# Patient Record
Sex: MALE | Race: WHITE | Employment: FULL TIME | URBAN - METROPOLITAN AREA
[De-identification: names, ages, dates, MRNs, and addresses within clinical notes are randomized per-mention and may not be internally consistent; named-entity substitution may affect disease eponyms.]

---

## 2017-01-09 NOTE — PROGRESS NOTES
[address]  [facility] [address]  [phone]  Dept: [phone]    PRE-OP EVALUATION:  Today's date: 2017    Mike Reyes (: 1965) presents for pre-operative evaluation assessment as requested by Dr. Low.  He requires evaluation and anesthesia risk assessment prior to undergoing surgery/procedure for colon cancer screening. Proposed procedure: Colonoscopy    Date of Surgery/ Procedure: 17  Time of Surgery/ Procedure: 9:00  Hospital/Surgical Facility: [facility]  Fax number for surgical facility: [phone]  Primary Physician: Delta Vegas  Type of Anesthesia Anticipated: General    Patient has a Health Care Directive or Living Will:  NO    1. NO - Do you have a history of heart attack, stroke, stent, bypass or surgery on an artery in the head, neck, heart or legs?  2. NO - Do you ever have any pain or discomfort in your chest?  3. NO - Do you have a history of  Heart Failure?  4. NO - Are you troubled by shortness of breath when: walking on the level, up a slight hill or at night?  5. NO - Do you currently have a cold, bronchitis or other respiratory infection?  6. NO - Do you have a cough, shortness of breath or wheezing?  7. NO - Do you sometimes get pains in the calves of your legs when you walk?  8. NO - Do you or anyone in your family have previous history of blood clots?  9. NO - Do you or does anyone in your family have a serious bleeding problem such as prolonged bleeding following surgeries or cuts?  10. NO - Have you ever had problems with anemia or been told to take iron pills?  11. NO - Have you had any abnormal blood loss such as black, tarry or bloody stools, or abnormal vaginal bleeding?  12. NO - Have you ever had a blood transfusion?  13. NO - Have you or any of your relatives ever had problems with anesthesia?  14. NO - Do you have sleep apnea, excessive snoring or daytime drowsiness?  15. NO - Do you have  any prosthetic heart valves?  16. NO - Do you have prosthetic joints?  17. NO - Is there any chance that you may be pregnant?      HPI:                                                      Brief HPI related to upcoming procedure:   Patient will be undergoing a colonoscopy for colon cancer screening so is here for a pre-operative exam.      See problem list for active medical problems.  Problems all longstanding and stable, except as noted/documented.  See ROS for pertinent symptoms related to these conditions.  He denies any history of cardiovascular disease or pulmonary disease.                                                                                                 MEDICAL HISTORY:                                                      Patient Active Problem List    Diagnosis Date Noted     CARDIOVASCULAR SCREENING; LDL GOAL LESS THAN 160 10/31/2010      History reviewed. No pertinent past medical history.  Past Surgical History   Procedure Laterality Date     No history of surgery       Current Outpatient Prescriptions   Medication Sig Dispense Refill     multivitamin, therapeutic with minerals (MULTI-VITAMIN) TABS Take 1 tablet by mouth daily.       Omega-3 Fatty Acids (OMEGA-3 FISH OIL PO) Take  by mouth.       OTC products: no recent use of OTC ASA, NSAIDS or Steroids    Allergies   Allergen Reactions     No Known Allergies       Latex Allergy: NO    Social History   Substance Use Topics     Smoking status: Never Smoker      Smokeless tobacco: Not on file     Alcohol Use: Yes      Comment: rare     History   Drug Use No       REVIEW OF SYSTEMS:                                                    C: NEGATIVE for fever, chills, change in weight  I: NEGATIVE for worrisome rashes, moles or lesions  E: NEGATIVE for vision changes or irritation  E/M: NEGATIVE for ear, mouth and throat problems  R: NEGATIVE for significant cough or SOB  CV: NEGATIVE for chest pain, palpitations or peripheral edema  GI: NEGATIVE  "for nausea, abdominal pain, heartburn, or change in bowel habits  : NEGATIVE for frequency, dysuria, or hematuria  M: NEGATIVE for significant arthralgias or myalgia  N: NEGATIVE for weakness, dizziness or paresthesias  E: NEGATIVE for temperature intolerance, skin/hair changes  H: NEGATIVE for bleeding problems  P: NEGATIVE for changes in mood or affect    EXAM:                                                    /76 mmHg  Pulse 74  Temp(Src) 98  F (36.7  C) (Temporal)  Resp 18  Ht 5' 9.45\" (1.764 m)  Wt 185 lb 6.4 oz (84.097 kg)  BMI 27.03 kg/m2    GENERAL APPEARANCE: healthy, alert and no distress     EYES: EOMI, - PERRL     HENT: ear canals and TM's normal and nose and mouth without ulcers or lesions     NECK: no adenopathy, no asymmetry, masses, or scars and thyroid normal to palpation     RESP: lungs clear to auscultation - no rales, rhonchi or wheezes     CV: regular rate and rhythm, normal S1 S2, no S3 or S4 and no murmur, click or rub -     ABDOMEN:  soft, nontender, no HSM or masses and bowel sounds normal     MS: extremities normal- no gross deformities noted, no evidence of inflammation in joints, FROM in all extremities.     SKIN: no suspicious lesions or rashes     NEURO: Normal strength and tone, mentation intact and speech normal. Cranial nerves II-XII are grossly intact. DTRs are 2+/4 throughout and symmetric. Gait is stable.      PSYCH: mentation appears normal. and affect normal/bright     LYMPHATICS: No axillary, cervical, inguinal, or supraclavicular nodes    DIAGNOSTICS:                                                    No labs or EKG required for low risk surgery (cataract, skin procedure, breast biopsy, etc)    Recent Labs   Lab Test  08/24/16   0757  10/12/10   0806   NA  140  141   POTASSIUM  4.1  4.0   CR  0.85  0.84        IMPRESSION:                                                    Reason for surgery/procedure: colon cancer screening   Diagnosis/reason for consult: " pre-operative clearance    The proposed surgical procedure is considered LOW risk.    REVISED CARDIAC RISK INDEX  The patient has the following serious cardiovascular risks for perioperative complications such as (MI, PE, VFib and 3  AV Block):  No serious cardiac risks  INTERPRETATION: 0 risks: Class I (very low risk - 0.4% complication rate)    The patient has the following additional risks for perioperative complications:  No identified additional risks      ICD-10-CM    1. Preop general physical exam Z01.818    2. Colon cancer screening Z12.11        RECOMMENDATIONS:                                                        APPROVAL GIVEN to proceed with proposed procedure, without further diagnostic evaluation       Signed Electronically by: Delta Vegas PA-C    Copy of this evaluation report is provided to requesting physician.    [facility] Preop Guidelines

## 2017-01-09 NOTE — PATIENT INSTRUCTIONS
Before Your Surgery      Call your surgeon if there is any change in your health. This includes signs of a cold or flu (such as a sore throat, runny nose, cough, rash or fever).    Do not smoke, drink alcohol or take over the counter medicine (unless your surgeon or primary care doctor tells you to) for the 24 hours before and after surgery.    If you take prescribed drugs: Follow your doctor s orders about which medicines to take and which to stop until after surgery.    Eating and drinking prior to surgery: follow the instructions from your surgeon

## 2017-01-11 ENCOUNTER — ANESTHESIA EVENT (OUTPATIENT)
Dept: GASTROENTEROLOGY | Facility: CLINIC | Age: 52
End: 2017-01-11
Payer: COMMERCIAL

## 2017-01-11 ENCOUNTER — OFFICE VISIT (OUTPATIENT)
Dept: FAMILY MEDICINE | Facility: OTHER | Age: 52
End: 2017-01-11
Payer: COMMERCIAL

## 2017-01-11 VITALS
HEART RATE: 74 BPM | DIASTOLIC BLOOD PRESSURE: 76 MMHG | WEIGHT: 185.4 LBS | HEIGHT: 69 IN | RESPIRATION RATE: 18 BRPM | SYSTOLIC BLOOD PRESSURE: 102 MMHG | BODY MASS INDEX: 27.46 KG/M2 | TEMPERATURE: 98 F

## 2017-01-11 DIAGNOSIS — Z12.11 COLON CANCER SCREENING: ICD-10-CM

## 2017-01-11 DIAGNOSIS — Z01.818 PREOP GENERAL PHYSICAL EXAM: Primary | ICD-10-CM

## 2017-01-11 PROCEDURE — 99214 OFFICE O/P EST MOD 30 MIN: CPT | Performed by: PHYSICIAN ASSISTANT

## 2017-01-11 ASSESSMENT — PAIN SCALES - GENERAL: PAINLEVEL: NO PAIN (0)

## 2017-01-11 NOTE — NURSING NOTE
"Chief Complaint   Patient presents with     Pre-Op Exam     Panel Management     height, flu, colono       Initial /76 mmHg  Pulse 74  Temp(Src) 98  F (36.7  C) (Temporal)  Resp 18  Ht 5' 9.45\" (1.764 m)  Wt 185 lb 6.4 oz (84.097 kg)  BMI 27.03 kg/m2 Estimated body mass index is 27.03 kg/(m^2) as calculated from the following:    Height as of this encounter: 5' 9.45\" (1.764 m).    Weight as of this encounter: 185 lb 6.4 oz (84.097 kg).  BP completed using cuff size: manuel Flood CMA - Pediatrics      "

## 2017-01-11 NOTE — MR AVS SNAPSHOT
After Visit Summary   1/11/2017    Mike Reyes    MRN: [id number]           Patient Information     Date Of Birth          1965        Visit Information        Provider Department      1/11/2017 7:00 AM Delta Vegas PA-C [facility]        Today's Diagnoses     Preop general physical exam    -  1     Colon cancer screening           Care Instructions      Before Your Surgery      Call your surgeon if there is any change in your health. This includes signs of a cold or flu (such as a sore throat, runny nose, cough, rash or fever).    Do not smoke, drink alcohol or take over the counter medicine (unless your surgeon or primary care doctor tells you to) for the 24 hours before and after surgery.    If you take prescribed drugs: Follow your doctor s orders about which medicines to take and which to stop until after surgery.    Eating and drinking prior to surgery: follow the instructions from your surgeon          Follow-ups after your visit        Your next 10 appointments already scheduled     Jan 12, 2017   Procedure with Nathalie Low MD   [facility] ([facility])    [address] [place] [id number] [phone]              Who to contact     If you have questions or need follow up information about today's clinic visit or your schedule please contact [facility] directly at [phone].  Normal or non-critical lab and imaging results will be communicated to you by MyChart, letter or phone within 4 business days after the clinic has received the results. If you do not hear from us within 7 days, please contact the clinic through MyChart or phone. If you have a critical or abnormal lab result, we will notify you by phone as soon as possible.  Submit refill requests through [employer] or call your pharmacy and they will forward the refill request to us. Please allow 3 business days for your refill to  "be completed.          Additional Information About Your Visit        [employer] Information     [employer] gives you secure access to your electronic health record. If you see a primary care provider, you can also send messages to your care team and make appointments. If you have questions, please call your primary care clinic.  If you do not have a primary care provider, please call [phone] and they will assist you.        Care EveryWhere ID     This is your Care EveryWhere ID. This could be used by other organizations to access your [place] medical records  [id number]        Your Vitals Were     Pulse Temperature Respirations Height BMI (Body Mass Index)       74 98  F (36.7  C) (Temporal) 18 5' 9.45\" (1.764 m) 27.03 kg/m2        Blood Pressure from Last 3 Encounters:   01/11/17 102/76   09/12/16 142/100   08/22/16 120/68    Weight from Last 3 Encounters:   01/11/17 185 lb 6.4 oz (84.097 kg)   09/12/16 182 lb (82.555 kg)   08/22/16 182 lb (82.555 kg)              Today, you had the following     No orders found for display       Primary Care Provider Office Phone # Fax #    Delta Vegas PA-C [phone]       [address]  [facility] [address]        Thank you!     Thank you for choosing [facility]  for your care. Our goal is always to provide you with excellent care. Hearing back from our patients is one way we can continue to improve our services. Please take a few minutes to complete the written survey that you may receive in the mail after your visit with us. Thank you!             Your Updated Medication List - Protect others around you: Learn how to safely use, store and throw away your medicines at www.disposemymeds.org.          This list is accurate as of: 1/11/17  7:43 AM.  Always use your most recent med list.                   Brand Name Dispense Instructions for use    Multi-vitamin Tabs tablet      Take 1 tablet by mouth " daily.       OMEGA-3 FISH OIL PO      Take  by mouth.

## 2017-01-12 ENCOUNTER — ANESTHESIA (OUTPATIENT)
Dept: GASTROENTEROLOGY | Facility: CLINIC | Age: 52
End: 2017-01-12
Payer: COMMERCIAL

## 2017-01-12 ENCOUNTER — SURGERY (OUTPATIENT)
Age: 52
End: 2017-01-12

## 2017-01-12 PROCEDURE — 25000125 ZZHC RX 250: Performed by: NURSE ANESTHETIST, CERTIFIED REGISTERED

## 2017-01-12 RX ORDER — PROPOFOL 10 MG/ML
INJECTION, EMULSION INTRAVENOUS PRN
Status: DISCONTINUED | OUTPATIENT
Start: 2017-01-12 | End: 2017-01-12

## 2017-01-12 RX ORDER — LIDOCAINE HYDROCHLORIDE 20 MG/ML
INJECTION, SOLUTION INFILTRATION; PERINEURAL PRN
Status: DISCONTINUED | OUTPATIENT
Start: 2017-01-12 | End: 2017-01-12

## 2017-01-12 RX ADMIN — PROPOFOL 40 MG: 10 INJECTION, EMULSION INTRAVENOUS at 09:03

## 2017-01-12 RX ADMIN — PROPOFOL 40 MG: 10 INJECTION, EMULSION INTRAVENOUS at 09:00

## 2017-01-12 RX ADMIN — PROPOFOL 20 MG: 10 INJECTION, EMULSION INTRAVENOUS at 09:05

## 2017-01-12 RX ADMIN — PROPOFOL 50 MG: 10 INJECTION, EMULSION INTRAVENOUS at 09:12

## 2017-01-12 RX ADMIN — LIDOCAINE HYDROCHLORIDE 40 MG: 20 INJECTION, SOLUTION INFILTRATION; PERINEURAL at 09:00

## 2017-01-12 RX ADMIN — PROPOFOL 20 MG: 10 INJECTION, EMULSION INTRAVENOUS at 09:09

## 2017-01-12 RX ADMIN — PROPOFOL 30 MG: 10 INJECTION, EMULSION INTRAVENOUS at 09:11

## 2017-01-12 RX ADMIN — PROPOFOL 50 MG: 10 INJECTION, EMULSION INTRAVENOUS at 09:14

## 2017-01-12 RX ADMIN — PROPOFOL 30 MG: 10 INJECTION, EMULSION INTRAVENOUS at 09:19

## 2017-01-12 RX ADMIN — PROPOFOL 30 MG: 10 INJECTION, EMULSION INTRAVENOUS at 09:17

## 2017-01-12 NOTE — ANESTHESIA POSTPROCEDURE EVALUATION
Patient: Mike Reyes    COLONOSCOPY (N/A Rectum)  Additional InformationProcedure(s):  COLONOSCOPY  - Wound Class: II-Clean Contaminated    Diagnosis:screening  Diagnosis Additional Information: No value filed.    Anesthesia Type:  MAC    Note:  Anesthesia Post Evaluation    Patient location during evaluation: Phase 2  Patient participation: Able to fully participate in evaluation  Level of consciousness: awake and alert  Pain management: adequate  Airway patency: patent  Cardiovascular status: acceptable  Respiratory status: acceptable  Hydration status: acceptable  PONV: none     Anesthetic complications: None          Last vitals:  Filed Vitals:    01/12/17 0926 01/12/17 0930 01/12/17 0945   BP: 92/54 85/52 88/58   Temp:      Resp: 16 16 16   SpO2: 98% 98% 99%       Electronically Signed By: FAZAL Martin CRNA  January 12, 2017  10:01 AM

## 2017-01-12 NOTE — ANESTHESIA PREPROCEDURE EVALUATION
Anesthesia Evaluation     . Pt has not had prior anesthetic       ROS/MED HX    ENT/Pulmonary:  - neg pulmonary ROS     Neurologic:  - neg neurologic ROS     Cardiovascular:  - neg cardiovascular ROS       METS/Exercise Tolerance:     Hematologic:  - neg hematologic  ROS       Musculoskeletal:  - neg musculoskeletal ROS       GI/Hepatic:     (+) bowel prep,       Renal/Genitourinary:  - ROS Renal section negative       Endo:  - neg endo ROS       Psychiatric:  - neg psychiatric ROS       Infectious Disease:  - neg infectious disease ROS       Malignancy:      - no malignancy   Other:    - neg other ROS           Physical Exam  Normal systems: cardiovascular, pulmonary and dental    Airway   Mallampati: II  TM distance: <3 FB  Neck ROM: full    Dental     Cardiovascular   Rhythm and rate: regular and normal      Pulmonary    breath sounds clear to auscultation                    Anesthesia Plan      History & Physical Review  History and physical reviewed and following examination; no interval change.    ASA Status:  1 .    NPO Status:  > 8 hours    Plan for MAC with Intravenous and Propofol induction. Reason for MAC:  Deep or markedly invasive procedure (G8)  PONV prophylaxis:  Ondansetron (or other 5HT-3) and Dexamethasone or Solumedrol       Postoperative Care      Consents  Anesthetic plan, risks, benefits and alternatives discussed with:  Patient.  Use of blood products discussed: No .   .                          .

## 2017-01-12 NOTE — ANESTHESIA CARE TRANSFER NOTE
Patient: Mike Reyes    COLONOSCOPY (N/A Rectum)  Additional InformationProcedure(s):  COLONOSCOPY  - Wound Class: II-Clean Contaminated    Diagnosis: screening  Diagnosis Additional Information: No value filed.    Anesthesia Type:   MAC     Note:  Airway :Face Mask  Patient transferred to:Phase II  Comments: Transported to Phase 2, report to RN.      Vitals: (Last set prior to Anesthesia Care Transfer)              Electronically Signed By: FAZAL Martin CRNA  January 12, 2017  9:26 AM

## 2017-03-23 NOTE — PROGRESS NOTES
"  SUBJECTIVE:                                                    Mike Reyes is a 51 year old male who presents to clinic today for the following health issues:      HPI    Joint Pain- Left thumb injury,  Right elbow     Onset:2-3 weeks ago.      Description:   Location: left thumb injury, right elbow  Character: Burning    Intensity: 2/10    Progression of Symptoms: worse-elbow    Accompanying Signs & Symptoms:  Other symptoms: none   History:   Previous similar pain: no       Precipitating factors:   Trauma or overuse: YES- thumb    Alleviating factors:  Improved by: nothing       Therapies Tried and outcome: tylenol, ice right away    Swelling is worsening, no numbness or tingling. Redness a little at the tip of the finger with bruising.  ROM slightly impaired.       Right Elbow:   No numbness or tingling  Pain with tenderness along the lateral side of the elbow   Grab and lifting increases pain   Repetitive motion bothers it along with grasping items.   Pain dose radiate down right forearm at times with aggravation.       Problem list and histories reviewed & adjusted, as indicated.  Additional history: as documented        ROS:  C: NEGATIVE for fever, chills, change in weight  R: NEGATIVE for significant cough or SOB  CV: NEGATIVE for chest pain, palpitations or peripheral edema    OBJECTIVE:                                                    /72  Pulse 52  Temp 97.9  F (36.6  C) (Temporal)  Resp 12  Ht 5' 9.17\" (1.757 m)  Wt 184 lb 6.4 oz (83.6 kg)  SpO2 97%  BMI 27.1 kg/m2  Body mass index is 27.1 kg/(m^2).  Physical Exam   Constitutional: He is oriented to person, place, and time. Vital signs are normal.   Musculoskeletal:        Right elbow: He exhibits normal range of motion, no swelling, no effusion, no deformity and no laceration. Tenderness found. Lateral epicondyle tenderness noted.        Left hand: He exhibits tenderness (left thumb near nail bed. ) and swelling. He exhibits normal " range of motion, no bony tenderness, normal capillary refill and no deformity. Normal sensation noted. Normal strength noted.   Left thumb- moderate amount of swelling and bruising, slightly warm, ROM intact, sensation intact, strength intact.       Right elbow- tenderness along the lateral epicondyle. No change in ROM, no swelling, no redness or bruising.    Neurological: He is alert and oriented to person, place, and time. He has normal strength. No sensory deficit.         Diagnostic Test Results:  Xray - right elbow and left thumb.      ASSESSMENT/PLAN:                                                        1. Injury of left thumb, initial encounter   - Suspect this could be a sprain vs musculoskeletal contusion. I did discuss that given the trauma from the hockey puck it is possible he did pop a blood vessel. We will see what the Xray results show. I did advise him to have some immobilization to help with the healing, I will also have him do ROM to keep the joint from getting stiff. It is reassuring he does not have any numbness or tingling and low pain.  - XR Finger Left G/E 2 Views; Future    2. Right elbow pain  - Suspect that given exam was positive for lateral epicondyle tenderness along with his HPI included pain down his arm with grasping and repetitive motion. No numbness or tingling.   - I will do an xray and he did want this, however, I did discuss with him home care for this including an elbow brace, stretching, NSAID therapy and possible physical therapy.   - XR Elbow Right 2 Views; Future    See Patient Instructions    FAZAL Mcallister [facility]

## 2017-03-24 ENCOUNTER — RADIANT APPOINTMENT (OUTPATIENT)
Dept: GENERAL RADIOLOGY | Facility: OTHER | Age: 52
End: 2017-03-24
Attending: NURSE PRACTITIONER
Payer: COMMERCIAL

## 2017-03-24 ENCOUNTER — TELEPHONE (OUTPATIENT)
Dept: FAMILY MEDICINE | Facility: OTHER | Age: 52
End: 2017-03-24

## 2017-03-24 ENCOUNTER — OFFICE VISIT (OUTPATIENT)
Dept: FAMILY MEDICINE | Facility: OTHER | Age: 52
End: 2017-03-24
Payer: COMMERCIAL

## 2017-03-24 VITALS
OXYGEN SATURATION: 97 % | WEIGHT: 184.4 LBS | RESPIRATION RATE: 12 BRPM | TEMPERATURE: 97.9 F | SYSTOLIC BLOOD PRESSURE: 114 MMHG | HEART RATE: 52 BPM | BODY MASS INDEX: 27.31 KG/M2 | DIASTOLIC BLOOD PRESSURE: 72 MMHG | HEIGHT: 69 IN

## 2017-03-24 DIAGNOSIS — M25.521 RIGHT ELBOW PAIN: ICD-10-CM

## 2017-03-24 DIAGNOSIS — S69.92XA INJURY OF LEFT THUMB, INITIAL ENCOUNTER: ICD-10-CM

## 2017-03-24 DIAGNOSIS — S62.525A CLOSED NONDISPLACED FRACTURE OF DISTAL PHALANX OF LEFT THUMB, INITIAL ENCOUNTER: Primary | ICD-10-CM

## 2017-03-24 DIAGNOSIS — S69.92XA INJURY OF LEFT THUMB, INITIAL ENCOUNTER: Primary | ICD-10-CM

## 2017-03-24 PROCEDURE — 73140 X-RAY EXAM OF FINGER(S): CPT | Mod: LT

## 2017-03-24 PROCEDURE — 73070 X-RAY EXAM OF ELBOW: CPT | Mod: RT

## 2017-03-24 PROCEDURE — 99213 OFFICE O/P EST LOW 20 MIN: CPT | Performed by: NURSE PRACTITIONER

## 2017-03-24 ASSESSMENT — PAIN SCALES - GENERAL: PAINLEVEL: MILD PAIN (2)

## 2017-03-24 NOTE — MR AVS SNAPSHOT
After Visit Summary   3/24/2017    Mike Reyes    MRN: [id number]           Patient Information     Date Of Birth          1965        Visit Information        Provider Department      3/24/2017 11:20 AM Libby Perez APRN CNP [facility]        Today's Diagnoses     Injury of left thumb, initial encounter    -  1    Right elbow pain          Care Instructions    - TYLENOL 1000 mg every 6 hours; maximum daily dose: 3000 mg daily alternate with ibuprofen   - Ice your thumb joint 3-4 times daily along with your right elbow  - Recommend keeping splint on your left thumb for support until we know what is going on.   -I will follow up with you regarding your xray results.     FAZAL Mcallister CNP      Tennis Elbow  Muscles connect to bones by thick, fibrous cords (tendons). When the muscles are overused by repeated motion, the tendons may become inflamed and painful. This condition is called tendonitis.  Tennis elbow (lateral epicondylitis) is a form of tendonitis. It occurs when the forearm muscles are used again and again in a twisting motion. Pain from tennis elbow occurs mainly on the outside of the elbow. But the pain can spread into the forearm and wrist. Your elbow may also be swollen and tender to the touch.  The pain may get worse when you move your arm or do simple activities. Bending your wrist back, shaking hands, or turning a doorknob may cause pain. The pain often gets worse after several weeks or months. Sometimes you may feel pain when your arm is still.  Tennis players who use a backhand stroke with poor technique are more likely to get tennis elbow. But playing tennis is only one cause of tennis elbow. Other common activities that can cause it include:    Hammering    Painting    Raking  Besides tennis players, people at risk include , gardeners, musicians, and dentists. Sometimes people get tennis elbow without doing anything that would cause  the injury.  Treatment includes resting the arm and taking anti-inflammatory medicines. Special splints help ease symptoms. Symptoms should get better after 4 to 6 weeks of rest. You may need steroid injections if resting and using a splint don t help. After the pain is relieved, you should change your activities so the symptoms don t return. You may need physical therapy. It may include stretching, range-of-motion, and strengthening exercises. These treatments help most cases. You may need surgery if your symptoms continue for 6 months.  Home care  Follow these guidelines when caring for yourself at home:    Rest your elbow as needed. Protect it from movement that causes pain. You may be told to use a forearm splint at night to ease symptoms in the morning. Your health care provider may recommend a special wrap or splint to compress the muscles of the forearm. This can ease pain during daytime activities. As your symptoms get better, start to move your elbow more.    Put an ice pack on the injured area. Do this for 20 minutes every 1 to 2 hours the first day for pain relief. You can make an ice pack by wrapping a plastic bag of ice cubes in a thin towel. Continue using the ice pack 3 to 4 times a day for the next 2 days. Then use the ice pack as needed to ease pain and swelling.    You may use acetaminophen or ibuprofen to control pain, unless another pain medicine was prescribed. If you have chronic liver or kidney disease, talk with your health care provider before using these medicines. Also talk with your provider if you ve had a stomach ulcer or GI bleeding.    After your elbow heals, avoid the motion that caused your pain. Or learn to move in a way that causes less stress on the tendon. Using a forearm wrap may keep tennis elbow from happening again.    A tennis elbow strap may ease pain and keep you from further injury when you start playing tennis again. You can also lower your risk for injury by warming up  before you play and cooling down afterward. You should also use the right equipment. For instance, make sure your racquet has the right  and is the right size for you.  Follow-up care  Follow up with your health care provider, or as advised, if your symptoms don t get better after 1 week of treatment.  When to seek medical advice  Call your health care provider right away if any of these occur:    Redness over the painful area    Pain or swelling at the elbow gets worse    Any numbness or tingling in your arm, hands, or fingers    Unexplained fever over 101 F (37.8 C)     0119-7806 [employer]. [address] [place] [address]. All rights reserved. This information is not intended as a substitute for professional medical care. Always follow your healthcare professional's instructions.        What is Tennis Elbow?        Repeated twisting of a screwdriver can cause problems over time.       Tennis elbow (also called lateral epicondylitis) is the gradual break down of the tendon around the bony knob (lateral epicondyle) on the outer side of the elbow. It occurs when the tissue that attaches muscle to the bone becomes irritated and somtimes inflamed.  Your lateral epicondyle  The muscles that allow you to straighten your fingers and rotate your lower arm and wrist are called the extensor muscles.  These muscles extend from the outer side of your elbow to your wrist and fingers. A cord-like fiber called a tendon attaches the extensor muscles to the elbow. Overuse or an accident can cause tissue in the tendon to become inflamed, injured, or degenerated.  Causes  Playing a racket sport can cause tennis elbow. So can doing anything that involves extending your wrist or rotating your forearm, such as:    Twisting a screwdriver    Hammering    Painting    Llifting heavy objects with your palm down  With age, the tissue may become injured or irritated more easily.  Symptoms  Symptoms generally  develop over time. The most common symptom of tennis elbow is pain or burning on the outer side of the elbow and down the forearm. You may have pain all the time or only when you lift things. The elbow may also swell. And it may hurt to  things, turn your hand, or swing your arm.     The road to healing  To prevent a flare-up after treatment, you may need to change the way you do some things. Gripping with the palm up, lifting heavy objects with both hands, or varying activities throughout the day will help reduce stress on the tendon.     3649-1750 [employer]. [address] [place] [address]. All rights reserved. This information is not intended as a substitute for professional medical care. Always follow your healthcare professional's instructions.              Follow-ups after your visit        Future tests that were ordered for you today     Open Future Orders        Priority Expected Expires Ordered    XR Finger Left G/E 2 Views Routine 3/24/2017 3/24/2018 3/24/2017    XR Elbow Right 2 Views Routine 3/24/2017 3/24/2018 3/24/2017            Who to contact     If you have questions or need follow up information about today's clinic visit or your schedule please contact [facility] directly at [phone].  Normal or non-critical lab and imaging results will be communicated to you by compropagohart, letter or phone within 4 business days after the clinic has received the results. If you do not hear from us within 7 days, please contact the clinic through compropagohart or phone. If you have a critical or abnormal lab result, we will notify you by phone as soon as possible.  Submit refill requests through [employer] or call your pharmacy and they will forward the refill request to us. Please allow 3 business days for your refill to be completed.          Additional Information About Your Visit        [employer] Information     [employer] gives you secure access to your electronic health  "record. If you see a primary care provider, you can also send messages to your care team and make appointments. If you have questions, please call your primary care clinic.  If you do not have a primary care provider, please call [phone] and they will assist you.        Care EveryWhere ID     This is your Care EveryWhere ID. This could be used by other organizations to access your [facility] medical records  [id number]        Your Vitals Were     Pulse Temperature Respirations Height Pulse Oximetry BMI (Body Mass Index)    52 97.9  F (36.6  C) (Temporal) 12 5' 9.17\" (1.757 m) 97% 27.1 kg/m2       Blood Pressure from Last 3 Encounters:   03/24/17 114/72   01/12/17 109/88   01/11/17 102/76    Weight from Last 3 Encounters:   03/24/17 184 lb 6.4 oz (83.6 kg)   01/11/17 185 lb 6.4 oz (84.1 kg)   09/12/16 182 lb (82.6 kg)               Primary Care Provider Office Phone # Fax #    [place] PA-[facility] [phone]       [facility] [address]  [facility] [address]        Thank you!     Thank you for choosing [facility]  for your care. Our goal is always to provide you with excellent care. Hearing back from our patients is one way we can continue to improve our services. Please take a few minutes to complete the written survey that you may receive in the mail after your visit with us. Thank you!             Your Updated Medication List - Protect others around you: Learn how to safely use, store and throw away your medicines at www.disposemymeds.org.          This list is accurate as of: 3/24/17 11:55 AM.  Always use your most recent med list.                   Brand Name Dispense Instructions for use    Multi-vitamin Tabs tablet      Take 1 tablet by mouth daily.       OMEGA-3 FISH OIL PO      Take  by mouth.         "

## 2017-03-24 NOTE — TELEPHONE ENCOUNTER
Lm for patient to call us back - thumb Fx- referral  Placed for ortho and inform him that someone will be calling him to set up appointment.

## 2017-03-24 NOTE — NURSING NOTE
"Chief Complaint   Patient presents with     Thumb Discomfort     left thumb injury     Panel Management     height       Initial /72  Pulse 52  Temp 97.9  F (36.6  C) (Temporal)  Resp 12  Ht 5' 9.17\" (1.757 m)  Wt 184 lb 6.4 oz (83.6 kg)  SpO2 97%  BMI 27.1 kg/m2 Estimated body mass index is 27.1 kg/(m^2) as calculated from the following:    Height as of this encounter: 5' 9.17\" (1.757 m).    Weight as of this encounter: 184 lb 6.4 oz (83.6 kg).  Medication Reconciliation: complete  Sinai Ruiz CMA (AAMA)    "

## 2017-03-24 NOTE — TELEPHONE ENCOUNTER
Please also inform patient his right elbow xray was normal. I would like him to continue with NSAID treatment and try a elbow brace. We can also consider PT if he would like.     FAZAL Mcallister CNP

## 2017-03-24 NOTE — PATIENT INSTRUCTIONS
- TYLENOL 1000 mg every 6 hours; maximum daily dose: 3000 mg daily alternate with ibuprofen   - Ice your thumb joint 3-4 times daily along with your right elbow  - Recommend keeping splint on your left thumb for support until we know what is going on.   -I will follow up with you regarding your xray results.     FAZAL Mcallister CNP      Tennis Elbow  Muscles connect to bones by thick, fibrous cords (tendons). When the muscles are overused by repeated motion, the tendons may become inflamed and painful. This condition is called tendonitis.  Tennis elbow (lateral epicondylitis) is a form of tendonitis. It occurs when the forearm muscles are used again and again in a twisting motion. Pain from tennis elbow occurs mainly on the outside of the elbow. But the pain can spread into the forearm and wrist. Your elbow may also be swollen and tender to the touch.  The pain may get worse when you move your arm or do simple activities. Bending your wrist back, shaking hands, or turning a doorknob may cause pain. The pain often gets worse after several weeks or months. Sometimes you may feel pain when your arm is still.  Tennis players who use a backhand stroke with poor technique are more likely to get tennis elbow. But playing tennis is only one cause of tennis elbow. Other common activities that can cause it include:    Hammering    Painting    Raking  Besides tennis players, people at risk include , gardeners, musicians, and dentists. Sometimes people get tennis elbow without doing anything that would cause the injury.  Treatment includes resting the arm and taking anti-inflammatory medicines. Special splints help ease symptoms. Symptoms should get better after 4 to 6 weeks of rest. You may need steroid injections if resting and using a splint don t help. After the pain is relieved, you should change your activities so the symptoms don t return. You may need physical therapy. It may include stretching,  range-of-motion, and strengthening exercises. These treatments help most cases. You may need surgery if your symptoms continue for 6 months.  Home care  Follow these guidelines when caring for yourself at home:    Rest your elbow as needed. Protect it from movement that causes pain. You may be told to use a forearm splint at night to ease symptoms in the morning. Your health care provider may recommend a special wrap or splint to compress the muscles of the forearm. This can ease pain during daytime activities. As your symptoms get better, start to move your elbow more.    Put an ice pack on the injured area. Do this for 20 minutes every 1 to 2 hours the first day for pain relief. You can make an ice pack by wrapping a plastic bag of ice cubes in a thin towel. Continue using the ice pack 3 to 4 times a day for the next 2 days. Then use the ice pack as needed to ease pain and swelling.    You may use acetaminophen or ibuprofen to control pain, unless another pain medicine was prescribed. If you have chronic liver or kidney disease, talk with your health care provider before using these medicines. Also talk with your provider if you ve had a stomach ulcer or GI bleeding.    After your elbow heals, avoid the motion that caused your pain. Or learn to move in a way that causes less stress on the tendon. Using a forearm wrap may keep tennis elbow from happening again.    A tennis elbow strap may ease pain and keep you from further injury when you start playing tennis again. You can also lower your risk for injury by warming up before you play and cooling down afterward. You should also use the right equipment. For instance, make sure your racquet has the right  and is the right size for you.  Follow-up care  Follow up with your health care provider, or as advised, if your symptoms don t get better after 1 week of treatment.  When to seek medical advice  Call your health care provider right away if any of these  occur:    Redness over the painful area    Pain or swelling at the elbow gets worse    Any numbness or tingling in your arm, hands, or fingers    Unexplained fever over 101 F (37.8 C)     6077-2814 [employer]. [address] [place] [address], [place] [address]. All rights reserved. This information is not intended as a substitute for professional medical care. Always follow your healthcare professional's instructions.        What is Tennis Elbow?        Repeated twisting of a screwdriver can cause problems over time.       Tennis elbow (also called lateral epicondylitis) is the gradual break down of the tendon around the bony knob (lateral epicondyle) on the outer side of the elbow. It occurs when the tissue that attaches muscle to the bone becomes irritated and somtimes inflamed.  Your lateral epicondyle  The muscles that allow you to straighten your fingers and rotate your lower arm and wrist are called the extensor muscles.  These muscles extend from the outer side of your elbow to your wrist and fingers. A cord-like fiber called a tendon attaches the extensor muscles to the elbow. Overuse or an accident can cause tissue in the tendon to become inflamed, injured, or degenerated.  Causes  Playing a racket sport can cause tennis elbow. So can doing anything that involves extending your wrist or rotating your forearm, such as:    Twisting a screwdriver    Hammering    Painting    Llifting heavy objects with your palm down  With age, the tissue may become injured or irritated more easily.  Symptoms  Symptoms generally develop over time. The most common symptom of tennis elbow is pain or burning on the outer side of the elbow and down the forearm. You may have pain all the time or only when you lift things. The elbow may also swell. And it may hurt to  things, turn your hand, or swing your arm.     The road to healing  To prevent a flare-up after treatment, you may need to change the way you do some things.  Gripping with the palm up, lifting heavy objects with both hands, or varying activities throughout the day will help reduce stress on the tendon.     1542-0820 [employer]. [address] [place] [address], [place] [address]. All rights reserved. This information is not intended as a substitute for professional medical care. Always follow your healthcare professional's instructions.

## 2017-03-24 NOTE — TELEPHONE ENCOUNTER
Please let patient know that he has a nondisplaced distal phalanx fracture. I would like him to keep the splint I gave him on and follow up with ortho at the next available appointment, ok to wait till Monday.   Continue with current medication regimen and icing. I am still awaiting a final read on his elbow xray.     Libby Perez, FAZAL CNP

## 2017-03-24 NOTE — TELEPHONE ENCOUNTER
Spoke to pt. He will try the brace and then consider PT if things dont improve. Putting reports in the mail for pt.

## 2017-03-24 NOTE — TELEPHONE ENCOUNTER
Patient informed. He is wondering if you could e mail the report to him? [email or web address]  Mail to home if cannot e mail

## 2017-03-27 ENCOUNTER — TELEPHONE (OUTPATIENT)
Dept: FAMILY MEDICINE | Facility: OTHER | Age: 52
End: 2017-03-27

## 2017-03-27 NOTE — TELEPHONE ENCOUNTER
Reason for call:  Patient states he received the xray results from 3/24/17, but not follow up instructions.  Is asking if he needs to see ortho, if casting is advised with thumb fracture. Or if wearing the splint is enough.  Also is asking for xray resuults to be released on my chart.    Please advise.  Would cristina call back today please.

## 2017-03-28 NOTE — TELEPHONE ENCOUNTER
Patient advised of results and that ortho will be calling to schedule and the reports should be viewable on MyChart.

## 2019-09-28 ENCOUNTER — HEALTH MAINTENANCE LETTER (OUTPATIENT)
Age: 54
End: 2019-09-28

## 2021-01-09 ENCOUNTER — HEALTH MAINTENANCE LETTER (OUTPATIENT)
Age: 56
End: 2021-01-09

## 2021-04-04 ASSESSMENT — ENCOUNTER SYMPTOMS
MYALGIAS: 0
WEAKNESS: 0
SHORTNESS OF BREATH: 0
FEVER: 0
HEARTBURN: 0
COUGH: 0
HEMATURIA: 0
ABDOMINAL PAIN: 0
DIZZINESS: 0
NERVOUS/ANXIOUS: 0
PALPITATIONS: 0
DYSURIA: 0
FREQUENCY: 0
CONSTIPATION: 0
NAUSEA: 0
DIARRHEA: 0
SORE THROAT: 0
ARTHRALGIAS: 0
JOINT SWELLING: 0
CHILLS: 0
HEMATOCHEZIA: 0
PARESTHESIAS: 0
HEADACHES: 0
EYE PAIN: 0

## 2021-04-05 NOTE — PROGRESS NOTES
SUBJECTIVE:   CC: Mike Reyes is an 55 year old male who presents for preventative health visit.       Patient has been advised of split billing requirements and indicates understanding: Yes  Healthy Habits:     Getting at least 3 servings of Calcium per day:  Yes    Bi-annual eye exam:  Yes    Dental care twice a year:  Yes    Sleep apnea or symptoms of sleep apnea:  None    Diet:  Regular (no restrictions)    Frequency of exercise:  1 day/week    Duration of exercise:  15-30 minutes    Taking medications regularly:  Yes    Medication side effects:  Not applicable    PHQ-2 Total Score: 0    Additional concerns today:  No    Mike presents today for his annual physical. Remains in good health with no acute concerns today.     Today's PHQ-2 Score:   PHQ-2 ( 1999 Pfizer) 4/4/2021   Q1: Little interest or pleasure in doing things 0   Q2: Feeling down, depressed or hopeless 0   PHQ-2 Score 0   Q1: Little interest or pleasure in doing things Not at all   Q2: Feeling down, depressed or hopeless Not at all   PHQ-2 Score 0       Abuse: Current or Past(Physical, Sexual or Emotional)- No  Do you feel safe in your environment? Yes    Have you ever done Advance Care Planning? (For example, a Health Directive, POLST, or a discussion with a medical provider or your loved ones about your wishes): No, advance care planning information given to patient to review.  Patient declined advance care planning discussion at this time.    Social History     Tobacco Use     Smoking status: Never Smoker     Smokeless tobacco: Never Used   Substance Use Topics     Alcohol use: Yes     Comment: 2-3 drinks a week     If you drink alcohol do you typically have >3 drinks per day or >7 drinks per week? No    Alcohol Use 4/8/2021   Prescreen: >3 drinks/day or >7 drinks/week? -   Prescreen: >3 drinks/day or >7 drinks/week? No       Last PSA:   PSA   Date Value Ref Range Status   08/24/2016 0.94 0 - 4 ug/L Final     Comment:     Assay Method:   "Chemiluminescence using Siemens Vista analyzer       Reviewed orders with patient. Reviewed health maintenance and updated orders accordingly - Yes      Reviewed and updated as needed this visit by clinical staff  Tobacco  Allergies  Meds  Problems  Med Hx  Surg Hx  Fam Hx  Soc Hx          Reviewed and updated as needed this visit by Provider  Tobacco  Allergies  Meds  Problems  Med Hx  Surg Hx  Fam Hx         Review of Systems   Constitutional: Negative for chills and fever.   HENT: Negative for congestion, ear pain, hearing loss and sore throat.    Eyes: Negative for pain and visual disturbance.   Respiratory: Negative for cough and shortness of breath.    Cardiovascular: Negative for chest pain, palpitations and peripheral edema.   Gastrointestinal: Negative for abdominal pain, constipation, diarrhea, heartburn, hematochezia and nausea.   Genitourinary: Negative for discharge, dysuria, frequency, genital sores, hematuria, impotence and urgency.   Musculoskeletal: Negative for arthralgias, joint swelling and myalgias.   Skin: Negative for rash.   Neurological: Negative for dizziness, weakness, headaches and paresthesias.   Psychiatric/Behavioral: Negative for mood changes. The patient is not nervous/anxious.        OBJECTIVE:   /80   Pulse 61   Temp 96.7  F (35.9  C)   Resp 16   Ht 1.74 m (5' 8.5\")   Wt 88 kg (194 lb)   SpO2 98%   BMI 29.07 kg/m      Physical Exam  GENERAL: healthy, alert and no distress  EYES: Eyes grossly normal to inspection, PERRL and conjunctivae and sclerae normal  HENT: ear canals and TM's normal, nose and mouth without ulcers or lesions  NECK: no adenopathy, no asymmetry, masses, or scars and thyroid normal to palpation  RESP: lungs clear to auscultation - no rales, rhonchi or wheezes  CV: regular rate and rhythm, normal S1 S2, no S3 or S4, no murmur, click or rub, no peripheral edema and peripheral pulses strong  ABDOMEN: soft, nontender, no hepatosplenomegaly, " "no masses and bowel sounds normal   (male): normal male circumcised genitalia without lesions or urethral discharge, no hernia  MS: no gross musculoskeletal defects noted, no edema. FROM to all extremities. No spinal tenderness.   SKIN: no suspicious lesions or rashes  NEURO: Normal strength and tone, mentation intact and speech normal. Cranial nerves II-XII are grossly intact. DTRs are 2+/4 throughout and symmetric. Gait is stable. .  PSYCH: mentation appears normal, affect normal/bright    ASSESSMENT/PLAN:       ICD-10-CM    1. Routine general medical examination at a health care facility  Z00.00    2. Screening for HIV (human immunodeficiency virus)  Z11.4 HIV Antigen Antibody Combo   3. Screening for prostate cancer  Z12.5 PSA, screen   4. Screening for diabetes mellitus  Z13.1 Basic metabolic panel  (Ca, Cl, CO2, Creat, Gluc, K, Na, BUN)   5. CARDIOVASCULAR SCREENING; LDL GOAL LESS THAN 160  Z13.6 Lipid panel reflex to direct LDL Fasting       2-5. Will obtain updated fasting labs. Scheduled for tomorrow at 7:15 am.     Follow up annually.    Patient has been advised of split billing requirements and indicates understanding: Yes  COUNSELING:   Reviewed preventive health counseling, as reflected in patient instructions       Regular exercise       Healthy diet/nutrition    Estimated body mass index is 29.07 kg/m  as calculated from the following:    Height as of this encounter: 1.74 m (5' 8.5\").    Weight as of this encounter: 88 kg (194 lb).     Weight management plan: Discussed healthy diet and exercise guidelines    He reports that he has never smoked. He has never used smokeless tobacco.      Counseling Resources:  ATP IV Guidelines  Pooled Cohorts Equation Calculator  FRAX Risk Assessment  ICSI Preventive Guidelines  Dietary Guidelines for Americans, 2010  USDA's MyPlate  ASA Prophylaxis  Lung CA Screening     Patient seen in conjunction with ELENA Guillen.     [place] PA-C  [facility] " [facility] BERTRAM AGUERO

## 2021-04-05 NOTE — PATIENT INSTRUCTIONS
Preventive Health Recommendations  Male Ages 50 - 64    Yearly exam:             See your health care provider every year in order to  o   Review health changes.   o   Discuss preventive care.    o   Review your medicines if your doctor has prescribed and     Have a cholesterol test every 5 years, or more frequently if you are at risk for high cholesterol/heart disease.     Have a diabetes test (fasting glucose) every three years. If you are at risk for diabetes, you should have this test more often.     Have a colonoscopy at age 50, or have a yearly FIT test (stool test). These exams will check for colon cancer.      Talk with your health care provider about whether or not a prostate cancer screening test (PSA) is right for you.    You should be tested each year for STDs (sexually transmitted diseases), if you re at risk.     Shots: Get a flu shot each year. Get a tetanus shot every 10 years.     Nutrition:    Eat at least 5 servings of fruits and vegetables daily.     Eat whole-grain bread, whole-wheat pasta and brown rice instead of white grains and rice.     Get adequate Calcium and Vitamin D.     Lifestyle    Exercise for at least 150 minutes a week (30 minutes a day, 5 days a week). This will help you control your weight and prevent disease.     Limit alcohol to one drink per day.     No smoking.     Wear sunscreen to prevent skin cancer.     See your dentist every six months for an exam and cleaning.     See your eye doctor every 1 to 2 years.    Come in for updated fasting labs tomorrow.

## 2021-04-08 ENCOUNTER — OFFICE VISIT (OUTPATIENT)
Dept: FAMILY MEDICINE | Facility: OTHER | Age: 56
End: 2021-04-08
Payer: COMMERCIAL

## 2021-04-08 VITALS
HEIGHT: 69 IN | RESPIRATION RATE: 16 BRPM | TEMPERATURE: 96.7 F | BODY MASS INDEX: 28.73 KG/M2 | HEART RATE: 61 BPM | SYSTOLIC BLOOD PRESSURE: 120 MMHG | OXYGEN SATURATION: 98 % | WEIGHT: 194 LBS | DIASTOLIC BLOOD PRESSURE: 80 MMHG

## 2021-04-08 DIAGNOSIS — Z13.6 CARDIOVASCULAR SCREENING; LDL GOAL LESS THAN 160: ICD-10-CM

## 2021-04-08 DIAGNOSIS — Z13.1 SCREENING FOR DIABETES MELLITUS: ICD-10-CM

## 2021-04-08 DIAGNOSIS — Z12.5 SCREENING FOR PROSTATE CANCER: ICD-10-CM

## 2021-04-08 DIAGNOSIS — Z00.00 ROUTINE GENERAL MEDICAL EXAMINATION AT A HEALTH CARE FACILITY: Primary | ICD-10-CM

## 2021-04-08 DIAGNOSIS — Z11.4 SCREENING FOR HIV (HUMAN IMMUNODEFICIENCY VIRUS): ICD-10-CM

## 2021-04-08 PROCEDURE — 99396 PREV VISIT EST AGE 40-64: CPT | Performed by: PHYSICIAN ASSISTANT

## 2021-04-08 ASSESSMENT — ENCOUNTER SYMPTOMS
EYE PAIN: 0
WEAKNESS: 0
COUGH: 0
HEMATOCHEZIA: 0
DYSURIA: 0
HEMATURIA: 0
PALPITATIONS: 0
CONSTIPATION: 0
NAUSEA: 0
HEADACHES: 0
MYALGIAS: 0
ARTHRALGIAS: 0
CHILLS: 0
NERVOUS/ANXIOUS: 0
JOINT SWELLING: 0
DIZZINESS: 0
FEVER: 0
SHORTNESS OF BREATH: 0
HEARTBURN: 0
ABDOMINAL PAIN: 0
DIARRHEA: 0
FREQUENCY: 0
PARESTHESIAS: 0
SORE THROAT: 0

## 2021-04-08 ASSESSMENT — MIFFLIN-ST. JEOR: SCORE: 1697.48

## 2021-04-09 DIAGNOSIS — Z13.6 CARDIOVASCULAR SCREENING; LDL GOAL LESS THAN 160: ICD-10-CM

## 2021-04-09 DIAGNOSIS — Z12.5 SCREENING FOR PROSTATE CANCER: ICD-10-CM

## 2021-04-09 DIAGNOSIS — Z11.4 SCREENING FOR HIV (HUMAN IMMUNODEFICIENCY VIRUS): ICD-10-CM

## 2021-04-09 DIAGNOSIS — Z13.1 SCREENING FOR DIABETES MELLITUS: ICD-10-CM

## 2021-04-09 LAB
ANION GAP SERPL CALCULATED.3IONS-SCNC: 2 MMOL/L (ref 3–14)
BUN SERPL-MCNC: 20 MG/DL (ref 7–30)
CALCIUM SERPL-MCNC: 8.8 MG/DL (ref 8.5–10.1)
CHLORIDE SERPL-SCNC: 106 MMOL/L (ref 94–109)
CHOLEST SERPL-MCNC: 242 MG/DL
CO2 SERPL-SCNC: 31 MMOL/L (ref 20–32)
CREAT SERPL-MCNC: 0.91 MG/DL (ref 0.66–1.25)
GFR SERPL CREATININE-BSD FRML MDRD: >90 ML/MIN/{1.73_M2}
GLUCOSE SERPL-MCNC: 96 MG/DL (ref 70–99)
HDLC SERPL-MCNC: 46 MG/DL
HIV 1+2 AB+HIV1 P24 AG SERPL QL IA: NONREACTIVE
LDLC SERPL CALC-MCNC: 175 MG/DL
NONHDLC SERPL-MCNC: 196 MG/DL
POTASSIUM SERPL-SCNC: 4.1 MMOL/L (ref 3.4–5.3)
PSA SERPL-ACNC: 1.49 UG/L (ref 0–4)
SODIUM SERPL-SCNC: 139 MMOL/L (ref 133–144)
TRIGL SERPL-MCNC: 106 MG/DL

## 2021-04-09 PROCEDURE — 80048 BASIC METABOLIC PNL TOTAL CA: CPT | Performed by: PHYSICIAN ASSISTANT

## 2021-04-09 PROCEDURE — G0103 PSA SCREENING: HCPCS | Performed by: PHYSICIAN ASSISTANT

## 2021-04-09 PROCEDURE — 80061 LIPID PANEL: CPT | Performed by: PHYSICIAN ASSISTANT

## 2021-04-09 PROCEDURE — 87389 HIV-1 AG W/HIV-1&-2 AB AG IA: CPT | Performed by: PHYSICIAN ASSISTANT

## 2021-04-09 PROCEDURE — 36415 COLL VENOUS BLD VENIPUNCTURE: CPT | Performed by: PHYSICIAN ASSISTANT

## 2021-04-12 ENCOUNTER — MYC MEDICAL ADVICE (OUTPATIENT)
Dept: FAMILY MEDICINE | Facility: OTHER | Age: 56
End: 2021-04-12

## 2021-04-12 DIAGNOSIS — E78.5 HYPERLIPIDEMIA LDL GOAL <130: Primary | ICD-10-CM

## 2021-04-13 RX ORDER — ATORVASTATIN CALCIUM 10 MG/1
10 TABLET, FILM COATED ORAL DAILY
Qty: 90 TABLET | Refills: 3 | Status: SHIPPED | OUTPATIENT
Start: 2021-04-13 | End: 2022-04-12

## 2021-10-23 ENCOUNTER — HEALTH MAINTENANCE LETTER (OUTPATIENT)
Age: 56
End: 2021-10-23

## 2022-06-04 ENCOUNTER — HEALTH MAINTENANCE LETTER (OUTPATIENT)
Age: 57
End: 2022-06-04

## 2022-08-31 DIAGNOSIS — E78.5 HYPERLIPIDEMIA LDL GOAL <130: ICD-10-CM

## 2022-09-06 NOTE — TELEPHONE ENCOUNTER
"Pending Prescriptions:                       Disp   Refills    atorvastatin (LIPITOR) 10 MG tablet [Pharm*90 tab*0        Sig: TAKE ONE TABLET BY MOUTH Once DAILY    Routing refill request to provider for review/approval because:  lab    Requested Prescriptions   Pending Prescriptions Disp Refills    atorvastatin (LIPITOR) 10 MG tablet [Pharmacy Med Name: atorvastatin 10 mg tablet] 90 tablet 0     Sig: TAKE ONE TABLET BY MOUTH Once DAILY        Statins Protocol Failed - 8/31/2022  4:48 PM        Failed - LDL on file in past 12 months     Recent Labs   Lab Test 04/09/21  0706   *               Failed - Recent (12 mo) or future (30 days) visit within the authorizing provider's specialty     Patient has had an office visit with the authorizing provider or a provider within the authorizing providers department within the previous 12 mos or has a future within next 30 days. See \"Patient Info\" tab in inbasket, or \"Choose Columns\" in Meds & Orders section of the refill encounter.              Passed - No abnormal creatine kinase in past 12 months     No lab results found.             Passed - Medication is active on med list        Passed - Patient is age 18 or older                    "

## 2022-09-08 ENCOUNTER — OFFICE VISIT (OUTPATIENT)
Dept: FAMILY MEDICINE | Facility: OTHER | Age: 57
End: 2022-09-08
Payer: COMMERCIAL

## 2022-09-08 VITALS
HEART RATE: 58 BPM | OXYGEN SATURATION: 99 % | SYSTOLIC BLOOD PRESSURE: 130 MMHG | TEMPERATURE: 97.6 F | RESPIRATION RATE: 18 BRPM | HEIGHT: 69 IN | DIASTOLIC BLOOD PRESSURE: 80 MMHG | WEIGHT: 191 LBS | BODY MASS INDEX: 28.29 KG/M2

## 2022-09-08 DIAGNOSIS — Z00.00 ENCOUNTER FOR ROUTINE ADULT HEALTH EXAMINATION WITHOUT ABNORMAL FINDINGS: Primary | ICD-10-CM

## 2022-09-08 DIAGNOSIS — M54.2 CERVICALGIA: ICD-10-CM

## 2022-09-08 DIAGNOSIS — Z13.1 SCREENING FOR DIABETES MELLITUS: ICD-10-CM

## 2022-09-08 DIAGNOSIS — E78.5 HYPERLIPIDEMIA LDL GOAL <130: ICD-10-CM

## 2022-09-08 DIAGNOSIS — Z23 NEED FOR TDAP VACCINATION: ICD-10-CM

## 2022-09-08 LAB
ANION GAP SERPL CALCULATED.3IONS-SCNC: 4 MMOL/L (ref 3–14)
BUN SERPL-MCNC: 18 MG/DL (ref 7–30)
CALCIUM SERPL-MCNC: 9.2 MG/DL (ref 8.5–10.1)
CHLORIDE BLD-SCNC: 105 MMOL/L (ref 94–109)
CHOLEST SERPL-MCNC: 171 MG/DL
CO2 SERPL-SCNC: 29 MMOL/L (ref 20–32)
CREAT SERPL-MCNC: 0.85 MG/DL (ref 0.66–1.25)
FASTING STATUS PATIENT QL REPORTED: NO
GFR SERPL CREATININE-BSD FRML MDRD: >90 ML/MIN/1.73M2
GLUCOSE BLD-MCNC: 109 MG/DL (ref 70–99)
HDLC SERPL-MCNC: 53 MG/DL
LDLC SERPL CALC-MCNC: 100 MG/DL
NONHDLC SERPL-MCNC: 118 MG/DL
POTASSIUM BLD-SCNC: 4.3 MMOL/L (ref 3.4–5.3)
SODIUM SERPL-SCNC: 138 MMOL/L (ref 133–144)
TRIGL SERPL-MCNC: 89 MG/DL

## 2022-09-08 PROCEDURE — 99214 OFFICE O/P EST MOD 30 MIN: CPT | Mod: 25 | Performed by: PHYSICIAN ASSISTANT

## 2022-09-08 PROCEDURE — 90715 TDAP VACCINE 7 YRS/> IM: CPT | Performed by: PHYSICIAN ASSISTANT

## 2022-09-08 PROCEDURE — 90471 IMMUNIZATION ADMIN: CPT | Performed by: PHYSICIAN ASSISTANT

## 2022-09-08 PROCEDURE — 80048 BASIC METABOLIC PNL TOTAL CA: CPT | Performed by: PHYSICIAN ASSISTANT

## 2022-09-08 PROCEDURE — 80061 LIPID PANEL: CPT | Performed by: PHYSICIAN ASSISTANT

## 2022-09-08 PROCEDURE — 99396 PREV VISIT EST AGE 40-64: CPT | Mod: 25 | Performed by: PHYSICIAN ASSISTANT

## 2022-09-08 PROCEDURE — 36415 COLL VENOUS BLD VENIPUNCTURE: CPT | Performed by: PHYSICIAN ASSISTANT

## 2022-09-08 RX ORDER — ATORVASTATIN CALCIUM 10 MG/1
10 TABLET, FILM COATED ORAL DAILY
Qty: 90 TABLET | Refills: 3 | Status: SHIPPED | OUTPATIENT
Start: 2022-09-08 | End: 2023-10-23

## 2022-09-08 ASSESSMENT — ENCOUNTER SYMPTOMS
DIARRHEA: 0
COUGH: 0
HEMATOCHEZIA: 0
MYALGIAS: 0
NAUSEA: 0
DYSURIA: 0
EYE PAIN: 0
JOINT SWELLING: 0
PALPITATIONS: 0
WEAKNESS: 0
ABDOMINAL PAIN: 0
DIZZINESS: 0
SHORTNESS OF BREATH: 0
FREQUENCY: 0
NERVOUS/ANXIOUS: 0
HEMATURIA: 0
HEADACHES: 0
HEARTBURN: 0
SORE THROAT: 0
PARESTHESIAS: 0
CONSTIPATION: 0
FEVER: 0
ARTHRALGIAS: 0
CHILLS: 0

## 2022-09-08 ASSESSMENT — PAIN SCALES - GENERAL: PAINLEVEL: NO PAIN (0)

## 2022-09-08 NOTE — PATIENT INSTRUCTIONS
Preventive Health Recommendations  Male Ages 50 - 64    Yearly exam:             See your health care provider every year in order to  o   Review health changes.   o   Discuss preventive care.    o   Review your medicines if your doctor has prescribed any.   Have a cholesterol test every 5 years, or more frequently if you are at risk for high cholesterol/heart disease.   Have a diabetes test (fasting glucose) every three years. If you are at risk for diabetes, you should have this test more often.   Have a colonoscopy at age 50, or have a yearly FIT test (stool test). These exams will check for colon cancer.    Talk with your health care provider about whether or not a prostate cancer screening test (PSA) is right for you.  You should be tested each year for STDs (sexually transmitted diseases), if you re at risk.     Shots: Get a flu shot each year. Get a tetanus shot every 10 years.     Nutrition:  Eat at least 5 servings of fruits and vegetables daily.   Eat whole-grain bread, whole-wheat pasta and brown rice instead of white grains and rice.   Get adequate Calcium and Vitamin D.     Lifestyle  Exercise for at least 150 minutes a week (30 minutes a day, 5 days a week). This will help you control your weight and prevent disease.   Limit alcohol to one drink per day.   No smoking.   Wear sunscreen to prevent skin cancer.   See your dentist every six months for an exam and cleaning.   See your eye doctor every 1 to 2 years.    I recommend trying chiropractic and continue with massage for the neck stiffness. Continue home stretching.  Stay well hydrated.    Follow-up annually.

## 2022-09-08 NOTE — PROGRESS NOTES
SUBJECTIVE:   CC: Mike Reyes is an 57 year old male who presents for preventative health visit.       Patient has been advised of split billing requirements and indicates understanding: Yes  Healthy Habits:     Getting at least 3 servings of Calcium per day:  Yes    Bi-annual eye exam:  Yes    Dental care twice a year:  Yes    Sleep apnea or symptoms of sleep apnea:  None    Diet:  Regular (no restrictions)    Frequency of exercise:  2-3 days/week    Duration of exercise:  15-30 minutes    Taking medications regularly:  Yes    Medication side effects:  Not applicable and None    PHQ-2 Total Score: 0    Additional concerns today:  Yes (people say he clears his throat a lot)      Neck Pain  Onset: 6-9 mo    Description:   Location: neck left side  Radiation: none    Intensity: mild    Progression of Symptoms:  same    Accompanying Signs & Symptoms:  Burning, prickly sensation (paresthesias) in arm(s): no   Numbness in arm(s): no   Weakness in arm(s):  no   Fever: no   Headache: no   Nausea and/or vomiting: no     History:   Trauma: no   Previous neck pain: maybe 20 years   Previous surgery or injections: no   Previous Imaging (MRI,X ray): went to PT    Precipitating factors:   Does movement increase the pain:  no     Alleviating factors:  none    Therapies Tried and outcome:  None    Left sided neck stiffness over the past year, better with massage and stretching. No radiation of the pain or any arm numbness, tingling or weakness.    He has been told he clears his throat often but he does not really notice. He denies a chronic cough, heartburn, seasonal allergies or postnasal drainage.    Hyperlipidemia Follow-Up      Are you regularly taking any medication or supplement to lower your cholesterol?   Yes- atorvastatin    Are you having muscle aches or other side effects that you think could be caused by your cholesterol lowering medication?  No      Today's PHQ-2 Score:   PHQ-2 ( 1999 Pfizer) 9/8/2022   Q1:  Little interest or pleasure in doing things 0   Q2: Feeling down, depressed or hopeless 0   PHQ-2 Score 0   PHQ-2 Total Score (12-17 Years)- Positive if 3 or more points; Administer PHQ-A if positive -   Q1: Little interest or pleasure in doing things Not at all   Q2: Feeling down, depressed or hopeless Not at all   PHQ-2 Score 0       Abuse: Current or Past(Physical, Sexual or Emotional)- No  Do you feel safe in your environment? Yes      Social History     Tobacco Use     Smoking status: Never Smoker     Smokeless tobacco: Never Used   Substance Use Topics     Alcohol use: Yes     Comment: 2-3 drinks a week         Alcohol Use 9/8/2022   Prescreen: >3 drinks/day or >7 drinks/week? No   Prescreen: >3 drinks/day or >7 drinks/week? -       Last PSA:   PSA   Date Value Ref Range Status   04/09/2021 1.49 0 - 4 ug/L Final     Comment:     Assay Method:  Chemiluminescence using Siemens Vista analyzer       Reviewed orders with patient. Reviewed health maintenance and updated orders accordingly - Yes    Reviewed and updated as needed this visit by clinical staff   Tobacco  Allergies  Meds  Problems  Med Hx  Surg Hx  Fam Hx  Soc   Hx        Reviewed and updated as needed this visit by Provider  Tobacco  Allergies  Meds  Problems  Med Hx  Surg Hx  Fam Hx        Review of Systems  CONSTITUTIONAL: NEGATIVE for fever, chills, change in weight  INTEGUMENTARY/SKIN: NEGATIVE for worrisome rashes, moles or lesions  EYES: NEGATIVE for vision changes or irritation  ENT: NEGATIVE for ear, mouth and throat problems  RESP: NEGATIVE for significant cough or SOB  CV: NEGATIVE for chest pain, palpitations or peripheral edema  GI: NEGATIVE for nausea, abdominal pain, heartburn, or change in bowel habits   male: negative for dysuria, hematuria, decreased urinary stream, erectile dysfunction, urethral discharge  MUSCULOSKELETAL: NEGATIVE for significant arthralgias or myalgia  NEURO: NEGATIVE for weakness, dizziness or  "paresthesias  ENDOCRINE: NEGATIVE for temperature intolerance, skin/hair changes  HEME/ALLERGY/IMMUNE: NEGATIVE for bleeding problems  PSYCHIATRIC: NEGATIVE for changes in mood or affect    OBJECTIVE:   /80   Pulse 58   Temp 97.6  F (36.4  C) (Temporal)   Resp 18   Ht 1.76 m (5' 9.29\")   Wt 86.6 kg (191 lb)   SpO2 99%   BMI 27.97 kg/m      Physical Exam  GENERAL: healthy, alert and no distress  EYES: Eyes grossly normal to inspection, PERRL and conjunctivae and sclerae normal  HENT: ear canals and TM's normal, nose and mouth without ulcers or lesions  NECK: no adenopathy, no asymmetry, masses, or scars and thyroid normal to palpation  RESP: lungs clear to auscultation - no rales, rhonchi or wheezes  CV: regular rate and rhythm, normal S1 S2, no S3 or S4, no murmur, click or rub, no peripheral edema and peripheral pulses strong  ABDOMEN: soft, nontender, no hepatosplenomegaly, no masses and bowel sounds normal   (male): normal male circumcised genitalia without lesions or urethral discharge, no hernia  MS: no gross musculoskeletal defects noted, no edema. FROM to all extremities. No spinal tenderness.   SKIN: no suspicious lesions or rashes  NEURO: Normal strength and tone, mentation intact and speech normal. Cranial nerves II-XII are grossly intact. DTRs are 2+/4 throughout and symmetric. Gait is stable.  PSYCH: mentation appears normal, affect normal/bright      ASSESSMENT/PLAN:       ICD-10-CM    1. Encounter for routine adult health examination without abnormal findings  Z00.00    2. Cervicalgia  M54.2    3. Hyperlipidemia LDL goal <130  E78.5 atorvastatin (LIPITOR) 10 MG tablet     Lipid panel reflex to direct LDL Fasting     Lipid panel reflex to direct LDL Fasting   4. Screening for diabetes mellitus  Z13.1 Basic metabolic panel  (Ca, Cl, CO2, Creat, Gluc, K, Na, BUN)     Basic metabolic panel  (Ca, Cl, CO2, Creat, Gluc, K, Na, BUN)   5. Need for Tdap vaccination  Z23 TDAP VACCINE (Adacel, " "Boostrix)       2. He likely has some cervical spondylosis or degenerative disc disease. Symptoms are mild at this time so I do not feel any imaging is necessary. I recommend ibuprofen, Tylenol, heat, massage and trial of chiropractic. If worsening, he will let me know.    3. Updated lipid panel ordered and he will restart Lipitor as he has been out of this for 6+ months.    4. BMP ordered.    5. Tdap administered by MA.    Follow-up annually.      Patient has been advised of split billing requirements and indicates understanding: Yes    COUNSELING:   Reviewed preventive health counseling, as reflected in patient instructions       Regular exercise       Healthy diet/nutrition    Estimated body mass index is 27.97 kg/m  as calculated from the following:    Height as of this encounter: 1.76 m (5' 9.29\").    Weight as of this encounter: 86.6 kg (191 lb).     Weight management plan: Discussed healthy diet and exercise guidelines    He reports that he has never smoked. He has never used smokeless tobacco.      Counseling Resources:  ATP IV Guidelines  Pooled Cohorts Equation Calculator  FRAX Risk Assessment  ICSI Preventive Guidelines  Dietary Guidelines for Americans, 2010  USDA's MyPlate  ASA Prophylaxis  Lung CA Screening    L[facility]  "

## 2022-09-09 RX ORDER — ATORVASTATIN CALCIUM 10 MG/1
TABLET, FILM COATED ORAL
Qty: 90 TABLET | Refills: 0 | OUTPATIENT
Start: 2022-09-09

## 2022-09-22 ENCOUNTER — ALLIED HEALTH/NURSE VISIT (OUTPATIENT)
Dept: FAMILY MEDICINE | Facility: OTHER | Age: 57
End: 2022-09-22
Payer: COMMERCIAL

## 2022-09-22 DIAGNOSIS — Z23 NEED FOR COVID-19 VACCINE: Primary | ICD-10-CM

## 2022-09-22 PROCEDURE — 0134A COVID-19,PF,MODERNA BIVALENT: CPT

## 2022-09-22 PROCEDURE — 91313 COVID-19,PF,MODERNA BIVALENT: CPT

## 2022-09-22 PROCEDURE — 99207 PR NO CHARGE NURSE ONLY: CPT

## 2022-10-09 ENCOUNTER — HEALTH MAINTENANCE LETTER (OUTPATIENT)
Age: 57
End: 2022-10-09

## 2023-08-09 ENCOUNTER — PATIENT OUTREACH (OUTPATIENT)
Dept: CARE COORDINATION | Facility: CLINIC | Age: 58
End: 2023-08-09
Payer: COMMERCIAL

## 2023-08-23 ENCOUNTER — PATIENT OUTREACH (OUTPATIENT)
Dept: CARE COORDINATION | Facility: CLINIC | Age: 58
End: 2023-08-23
Payer: COMMERCIAL

## 2023-10-23 DIAGNOSIS — E78.5 HYPERLIPIDEMIA LDL GOAL <130: ICD-10-CM

## 2023-10-23 RX ORDER — ATORVASTATIN CALCIUM 10 MG/1
10 TABLET, FILM COATED ORAL DAILY
Qty: 60 TABLET | Refills: 0 | Status: SHIPPED | OUTPATIENT
Start: 2023-10-23 | End: 2023-12-29

## 2023-10-28 ENCOUNTER — HEALTH MAINTENANCE LETTER (OUTPATIENT)
Age: 58
End: 2023-10-28

## 2023-12-13 ASSESSMENT — ENCOUNTER SYMPTOMS
PARESTHESIAS: 0
DYSURIA: 0
COUGH: 1
ARTHRALGIAS: 0
HEMATOCHEZIA: 0
DIARRHEA: 0
FREQUENCY: 0
ABDOMINAL PAIN: 0
HEADACHES: 0
NERVOUS/ANXIOUS: 0
PALPITATIONS: 0
WEAKNESS: 0
EYE PAIN: 0
MYALGIAS: 0
SHORTNESS OF BREATH: 0
CHILLS: 0
NAUSEA: 0
FEVER: 0
SORE THROAT: 0
DIZZINESS: 0
HEMATURIA: 0
HEARTBURN: 0
CONSTIPATION: 0
JOINT SWELLING: 0

## 2023-12-13 NOTE — COMMUNITY RESOURCES LIST (ENGLISH)
12/13/2023   [facility] [employer]  N/A  For questions about this resource list or additional care needs, please contact your primary care clinic or care manager.  Phone: [phone]   Email: N/A   Address: [address] [place] [id number]   Hours: N/A        Financial Stability       Utility payment assistance  1  [facility] ([place]) - Emergency Assistance - Utility payment assistance Distance: 1.54 miles      In-Person   [address] [place] [address]  Language: English, Mozambican  Hours: Mon 10:00 AM - 1:30 PM Appt. Only, Wed 10:00 AM - 1:30 PM Appt. Only, Thu 4:30 PM - 6:30 PM Appt. Only, Fri 10:00 AM - 1:30 PM Appt. Only  Fees: Free   Phone: (313) 991-6468 Email: [email or web address] Website: [email or web address]     2  [facility] - Emergency Assistance - Utility payment assistance Distance: 2.65 miles      In-Person   [address] [place] [address]  Language: English  Hours: Mon - Fri 8:00 AM - 4:30 PM  Fees: Free   Phone: (868) 924-9369 Email: [email or web address]. Website: [email or web address]          Important Numbers & Websites       Emergency Services   911  [facility] Services   311  Poison Control   (747) 562-9694  Suicide Prevention Lifeline   (964) 872-6986 (TALK)  Child Abuse Hotline   (201) 506-9957 (4-A-Child)  Sexual Assault Hotline   (770) 820-2013 (HOPE)  National Runaway Safeline   (285) 528-9803 (RUNAWAY)  All-Options Talkline   (595) 694-6238  Substance Abuse Referral   (749) 803-9494 (HELP)

## 2023-12-15 ENCOUNTER — OFFICE VISIT (OUTPATIENT)
Dept: FAMILY MEDICINE | Facility: OTHER | Age: 58
End: 2023-12-15
Payer: COMMERCIAL

## 2023-12-15 VITALS
OXYGEN SATURATION: 99 % | HEIGHT: 69 IN | WEIGHT: 193.5 LBS | BODY MASS INDEX: 28.66 KG/M2 | SYSTOLIC BLOOD PRESSURE: 120 MMHG | DIASTOLIC BLOOD PRESSURE: 76 MMHG | RESPIRATION RATE: 15 BRPM | TEMPERATURE: 97.6 F | HEART RATE: 63 BPM

## 2023-12-15 DIAGNOSIS — Z00.00 ROUTINE HISTORY AND PHYSICAL EXAMINATION OF ADULT: Primary | ICD-10-CM

## 2023-12-15 DIAGNOSIS — E78.5 HYPERLIPIDEMIA LDL GOAL <130: ICD-10-CM

## 2023-12-15 LAB
ALBUMIN SERPL BCG-MCNC: 4.4 G/DL (ref 3.5–5.2)
ALP SERPL-CCNC: 72 U/L (ref 40–150)
ALT SERPL W P-5'-P-CCNC: 23 U/L (ref 0–70)
ANION GAP SERPL CALCULATED.3IONS-SCNC: 13 MMOL/L (ref 7–15)
AST SERPL W P-5'-P-CCNC: 26 U/L (ref 0–45)
BILIRUB SERPL-MCNC: 1 MG/DL
BUN SERPL-MCNC: 20 MG/DL (ref 6–20)
CALCIUM SERPL-MCNC: 9 MG/DL (ref 8.6–10)
CHLORIDE SERPL-SCNC: 104 MMOL/L (ref 98–107)
CHOLEST SERPL-MCNC: 140 MG/DL
CREAT SERPL-MCNC: 0.87 MG/DL (ref 0.67–1.17)
DEPRECATED HCO3 PLAS-SCNC: 24 MMOL/L (ref 22–29)
EGFRCR SERPLBLD CKD-EPI 2021: >90 ML/MIN/1.73M2
ERYTHROCYTE [DISTWIDTH] IN BLOOD BY AUTOMATED COUNT: 12 % (ref 10–15)
FASTING STATUS PATIENT QL REPORTED: NO
GLUCOSE SERPL-MCNC: 95 MG/DL (ref 70–99)
HCT VFR BLD AUTO: 37.5 % (ref 40–53)
HDLC SERPL-MCNC: 44 MG/DL
HGB BLD-MCNC: 12.7 G/DL (ref 13.3–17.7)
LDLC SERPL CALC-MCNC: 46 MG/DL
MCH RBC QN AUTO: 29.2 PG (ref 26.5–33)
MCHC RBC AUTO-ENTMCNC: 33.9 G/DL (ref 31.5–36.5)
MCV RBC AUTO: 86 FL (ref 78–100)
NONHDLC SERPL-MCNC: 96 MG/DL
PLATELET # BLD AUTO: 276 10E3/UL (ref 150–450)
POTASSIUM SERPL-SCNC: 4.1 MMOL/L (ref 3.4–5.3)
PROT SERPL-MCNC: 6.8 G/DL (ref 6.4–8.3)
PSA SERPL DL<=0.01 NG/ML-MCNC: 1.65 NG/ML (ref 0–3.5)
RBC # BLD AUTO: 4.35 10E6/UL (ref 4.4–5.9)
SODIUM SERPL-SCNC: 141 MMOL/L (ref 135–145)
TRIGL SERPL-MCNC: 249 MG/DL
WBC # BLD AUTO: 7.8 10E3/UL (ref 4–11)

## 2023-12-15 PROCEDURE — 90480 ADMN SARSCOV2 VAC 1/ONLY CMP: CPT | Performed by: PHYSICIAN ASSISTANT

## 2023-12-15 PROCEDURE — 91320 SARSCV2 VAC 30MCG TRS-SUC IM: CPT | Performed by: PHYSICIAN ASSISTANT

## 2023-12-15 PROCEDURE — 99396 PREV VISIT EST AGE 40-64: CPT | Mod: 25 | Performed by: PHYSICIAN ASSISTANT

## 2023-12-15 PROCEDURE — 80061 LIPID PANEL: CPT | Performed by: PHYSICIAN ASSISTANT

## 2023-12-15 PROCEDURE — 85027 COMPLETE CBC AUTOMATED: CPT | Performed by: PHYSICIAN ASSISTANT

## 2023-12-15 PROCEDURE — 80053 COMPREHEN METABOLIC PANEL: CPT | Performed by: PHYSICIAN ASSISTANT

## 2023-12-15 PROCEDURE — 36415 COLL VENOUS BLD VENIPUNCTURE: CPT | Performed by: PHYSICIAN ASSISTANT

## 2023-12-15 PROCEDURE — G0103 PSA SCREENING: HCPCS | Performed by: PHYSICIAN ASSISTANT

## 2023-12-15 ASSESSMENT — ENCOUNTER SYMPTOMS
HEADACHES: 0
NERVOUS/ANXIOUS: 0
HEMATURIA: 0
PARESTHESIAS: 0
FEVER: 0
EYE PAIN: 0
PALPITATIONS: 0
HEMATOCHEZIA: 0
SORE THROAT: 0
JOINT SWELLING: 0
HEARTBURN: 0
DIARRHEA: 0
CHILLS: 0
ABDOMINAL PAIN: 0
CONSTIPATION: 0
ARTHRALGIAS: 0
COUGH: 1
DYSURIA: 0
MYALGIAS: 0
FREQUENCY: 0
WEAKNESS: 0
SHORTNESS OF BREATH: 0
DIZZINESS: 0
NAUSEA: 0

## 2023-12-15 ASSESSMENT — PAIN SCALES - GENERAL: PAINLEVEL: NO PAIN (0)

## 2023-12-15 NOTE — COMMUNITY RESOURCES LIST (ENGLISH)
12/15/2023   [facility] - Outpatient Clinics  N/A  For additional resource needs, please contact your health insurance member services or your primary care team.  Phone: [phone]   Email: N/A   Address: [facility] [address] [place] [address]   Hours: N/A        Financial Stability       Utility payment assistance  1  [facility] [facility] - Energy and Utilities Distance: 33.58 miles      In-Person, Phone/Virtual   [address] [place] [address]  Language: English  Hours: Mon - Fri 8:30 AM - 4:30 PM  Fees: Free   Phone: (815) 986-6589 Website: [place]://[place]/[employer]/energy/consumer-assistance/energy-assistance-program/     2  [place] [employer] [facility] [place]'s Telephone Assistance Plan (TAP) and [facility] Lifeline and Affordable Connectivity Program (ACP) Distance: 37.14 miles      Phone/Virtual   [address] [place] [address]  Language: English  Fees: Free   Phone: (278) 893-1258 Email: [email or web address]. Website: https://[place]/puc/consumers/telephone/          Important Numbers & Websites       [facility]   211 211unitedway.org  Poison Control   (929) 147-7423 [place]  Suicide and Crisis Lifeline   [address] [place], [place] [address]line.org  Childhelp [place] Child Abuse Hotline   661.786.5404 Childhelphotline.org  National Sexual Assault Hotline   (651) 520-3185 (HOPE) Rainn.org  National Runaway Safeline   (994) 439-9284 (RUNAWAY) 1800runaway.org  Pregnancy & Postpartum Support [place]   Call/text 674-352-1709 Ppsupport[place].org  Substance Abuse National Helpline ([facility]   454-407-HELP (7427) Findtreatment.gov  Emergency Services   911

## 2023-12-15 NOTE — PROGRESS NOTES
No care due was identified.  [facility] Embedded Care Due Messages. Reference number: [id number].   5/27/2023 4:35:53 AM CDT   SUBJECTIVE:   Mike is a 58 year old, presenting for the following:  Physical        12/15/2023     3:42 PM   Additional Questions   Roomed by Kaylah WEAVER       Healthy Habits:     Getting at least 3 servings of Calcium per day:  Yes    Bi-annual eye exam:  Yes    Dental care twice a year:  Yes    Sleep apnea or symptoms of sleep apnea:  None    Diet:  Regular (no restrictions)    Frequency of exercise:  2-3 days/week    Duration of exercise:  15-30 minutes    Taking medications regularly:  Yes    Medication side effects:  Not applicable    Additional concerns today:  No      Today's PHQ-2 Score:       12/15/2023     3:38 PM   PHQ-2 ( 1999 [employer])   Q1: Little interest or pleasure in doing things 0   Q2: Feeling down, depressed or hopeless 0   PHQ-2 Score 0   Q1: Little interest or pleasure in doing things Not at all   Q2: Feeling down, depressed or hopeless Not at all   PHQ-2 Score 0     Social History     Tobacco Use     Smoking status: Never     Passive exposure: Never     Smokeless tobacco: Never   Substance Use Topics     Alcohol use: Yes     Comment: 2-3 drinks a week             12/13/2023    11:06 AM   Alcohol Use   Prescreen: >3 drinks/day or >7 drinks/week? No       Last PSA:   PSA   Date Value Ref Range Status   04/09/2021 1.49 0 - 4 ug/L Final     Comment:     Assay Method:  Chemiluminescence using Siemens Vista analyzer       Reviewed orders with patient. Reviewed health maintenance and updated orders accordingly - Yes  Lab work is in process  Labs reviewed in EPIC  BP Readings from Last 3 Encounters:   12/15/23 120/76   09/08/22 130/80   04/08/21 120/80    Wt Readings from Last 3 Encounters:   12/15/23 87.8 kg (193 lb 8 oz)   09/08/22 86.6 kg (191 lb)   04/08/21 88 kg (194 lb)                  Patient Active Problem List   Diagnosis     Hyperlipidemia LDL goal <130     Past Surgical History:   Procedure Laterality Date     COLONOSCOPY N/A 1/12/2017    Procedure: COLONOSCOPY;  Surgeon: aNthalie Low  MD Sandra;  Location:  GI     NO HISTORY OF SURGERY         Social History     Tobacco Use     Smoking status: Never     Passive exposure: Never     Smokeless tobacco: Never   Substance Use Topics     Alcohol use: Yes     Comment: 2-3 drinks a week     Family History   Problem Relation Age of Onset     Family History Negative No family hx of      C.A.D. No family hx of      Diabetes No family hx of      Hypertension No family hx of      Cerebrovascular Disease No family hx of      Breast Cancer No family hx of      Cancer - colorectal No family hx of      Prostate Cancer No family hx of          Current Outpatient Medications   Medication Sig Dispense Refill     atorvastatin (LIPITOR) 10 MG tablet TAKE 1 TABLET BY MOUTH ONCE DAILY 60 tablet 0     Allergies   Allergen Reactions     No Known Allergies      Recent Labs   Lab Test 09/08/22  0944 04/09/21  0706 08/24/16  0757    175* 129*   HDL 53 46 56   TRIG 89 106 111   CR 0.85 0.91 0.85   GFRESTIMATED >90 >90 >90  Non African American GFR Calc     GFRESTBLACK  --  >90 >90  African American GFR Calc     POTASSIUM 4.3 4.1 4.1        Reviewed and updated as needed this visit by clinical staff   Tobacco  Allergies  Meds              Reviewed and updated as needed this visit by Provider                 Past Medical History:   Diagnosis Date     Hyperlipidemia LDL goal <130 12/15/2023      Past Surgical History:   Procedure Laterality Date     COLONOSCOPY N/A 1/12/2017    Procedure: COLONOSCOPY;  Surgeon: Nathalie Low MD;  Location:  GI     NO HISTORY OF SURGERY         Review of Systems   Constitutional:  Negative for chills and fever.   HENT:  Negative for congestion, ear pain, hearing loss and sore throat.    Eyes:  Negative for pain and visual disturbance.   Respiratory:  Positive for cough. Negative for shortness of breath.    Cardiovascular:  Negative for chest pain, palpitations and peripheral edema.   Gastrointestinal:  Negative for  "abdominal pain, constipation, diarrhea, heartburn, hematochezia and nausea.   Genitourinary:  Negative for dysuria, frequency, genital sores, hematuria, impotence, penile discharge and urgency.   Musculoskeletal:  Negative for arthralgias, joint swelling and myalgias.   Skin:  Negative for rash.   Neurological:  Negative for dizziness, weakness, headaches and paresthesias.   Psychiatric/Behavioral:  Negative for mood changes. The patient is not nervous/anxious.      OBJECTIVE:   /76 (Cuff Size: Adult Large)   Pulse 63   Temp 97.6  F (36.4  C) (Oral)   Resp 15   Ht 1.755 m (5' 9.09\")   Wt 87.8 kg (193 lb 8 oz)   SpO2 99%   BMI 28.50 kg/m      Physical Exam  GENERAL: healthy, alert and no distress  EYES: Eyes grossly normal to inspection, PERRL and conjunctivae and sclerae normal  HENT: ear canals and TM's normal, nose and mouth without ulcers or lesions  NECK: no adenopathy, no asymmetry, masses, or scars and thyroid normal to palpation  RESP: lungs clear to auscultation - no rales, rhonchi or wheezes  CV: regular rate and rhythm, normal S1 S2, no S3 or S4, no murmur, click or rub, no peripheral edema and peripheral pulses strong  ABDOMEN: soft, nontender, no hepatosplenomegaly, no masses and bowel sounds normal  MS: no gross musculoskeletal defects noted, no edema  SKIN: no suspicious lesions or rashes  NEURO: Normal strength and tone, mentation intact and speech normal  PSYCH: mentation appears normal, affect normal/bright    Diagnostic Test Results:  Labs reviewed in Epic  No results found for any visits on 12/15/23.    ASSESSMENT/PLAN:   Mike was seen today for physical.    Diagnoses and all orders for this visit:    Routine history and physical examination of adult  -     CBC with platelets; Future  -     Comprehensive metabolic panel (BMP + Alb, Alk Phos, ALT, AST, Total. Bili, TP); Future  -     Lipid panel reflex to direct LDL Non-fasting; Future  -     PSA, screen; Future    Hyperlipidemia LDL " "goal <130    Other orders  -     COVID-19 12+ (2023-24) (PFIZER)  -     REVIEW OF HEALTH MAINTENANCE PROTOCOL ORDERS    58-year-old male here for physical.  Labs pending.  Follow-up based on results.    LDL goal less than 130 is established.  Labs pending.  Follow-up based on results.  Refill medications based on results as well.    Patient has been advised of split billing requirements and indicates understanding: Yes      COUNSELING:   Reviewed preventive health counseling, as reflected in patient instructions       Regular exercise       Healthy diet/nutrition       Vision screening       Hearing screening       Aspirin prophylaxis        Alcohol Use        Colorectal cancer screening       Prostate cancer screening      BMI:   Estimated body mass index is 28.5 kg/m  as calculated from the following:    Height as of this encounter: 1.755 m (5' 9.09\").    Weight as of this encounter: 87.8 kg (193 lb 8 oz).   Weight management plan: Discussed healthy diet and exercise guidelines      He reports that he has never smoked. He has never been exposed to tobacco smoke. He has never used smokeless tobacco.    John Briseno PA-C  [facility]  "

## 2023-12-15 NOTE — COMMUNITY RESOURCES LIST (ENGLISH)
12/15/2023   [facility] [employer]  N/A  For questions about this resource list or additional care needs, please contact your primary care clinic or care manager.  Phone: [phone]   Email: N/A   Address: [address] [place] [id number]   Hours: N/A        Financial Stability       Utility payment assistance  1  [facility] ([place]) - Emergency Assistance - Utility payment assistance Distance: 1.54 miles      In-Person   [address] [place] [address]  Language: English, [place] Islander  Hours: Mon 10:00 AM - 1:30 PM Appt. Only, Wed 10:00 AM - 1:30 PM Appt. Only, Thu 4:30 PM - 6:30 PM Appt. Only, Fri 10:00 AM - 1:30 PM Appt. Only  Fees: Free   Phone: (564) 519-3680 Email: info@[email or web address].ca Website: [email or web address]     2  [facility] - Emergency Assistance - Utility payment assistance Distance: 2.65 miles      In-Person   [address] [place] [address]  Language: English  Hours: Mon - Fri 8:00 AM - 4:30 PM  Fees: Free   Phone: (779) 940-1821 Email: [email or web address]. Website: [email or web address]          Important Numbers & Websites       Emergency Services   911  [facility] Services   311  Poison Control   (385) 926-1698  Suicide Prevention Lifeline   (760) 702-6622 (TALK)  Child Abuse Hotline   (397) 821-5558 (4-A-Child)  Sexual Assault Hotline   (166) 408-2025 (HOPE)  National Runaway Safeline   (248) 970-1292 (RUNAWAY)  All-Options Talkline   (648) 250-5940  Substance Abuse Referral   (630) 202-6407 (HELP)

## 2023-12-29 DIAGNOSIS — E78.5 HYPERLIPIDEMIA LDL GOAL <130: ICD-10-CM

## 2023-12-29 RX ORDER — ATORVASTATIN CALCIUM 10 MG/1
10 TABLET, FILM COATED ORAL DAILY
Qty: 60 TABLET | Refills: 0 | Status: SHIPPED | OUTPATIENT
Start: 2023-12-29 | End: 2024-02-23

## 2024-02-23 DIAGNOSIS — E78.5 HYPERLIPIDEMIA LDL GOAL <130: ICD-10-CM

## 2024-02-26 RX ORDER — ATORVASTATIN CALCIUM 10 MG/1
10 TABLET, FILM COATED ORAL DAILY
Qty: 60 TABLET | Refills: 0 | Status: SHIPPED | OUTPATIENT
Start: 2024-02-26 | End: 2024-04-30

## 2024-04-30 DIAGNOSIS — E78.5 HYPERLIPIDEMIA LDL GOAL <130: ICD-10-CM

## 2024-04-30 RX ORDER — ATORVASTATIN CALCIUM 10 MG/1
10 TABLET, FILM COATED ORAL DAILY
Qty: 60 TABLET | Refills: 0 | Status: SHIPPED | OUTPATIENT
Start: 2024-04-30 | End: 2024-07-08

## 2024-05-17 ENCOUNTER — OFFICE VISIT (OUTPATIENT)
Dept: FAMILY MEDICINE | Facility: OTHER | Age: 59
End: 2024-05-17
Payer: COMMERCIAL

## 2024-05-17 VITALS
OXYGEN SATURATION: 95 % | BODY MASS INDEX: 28.72 KG/M2 | RESPIRATION RATE: 16 BRPM | WEIGHT: 195 LBS | HEART RATE: 62 BPM | SYSTOLIC BLOOD PRESSURE: 140 MMHG | TEMPERATURE: 97.8 F | DIASTOLIC BLOOD PRESSURE: 82 MMHG

## 2024-05-17 DIAGNOSIS — I10 HTN, GOAL BELOW 140/80: ICD-10-CM

## 2024-05-17 DIAGNOSIS — R25.1 TREMOR: ICD-10-CM

## 2024-05-17 DIAGNOSIS — M25.562 LEFT KNEE PAIN, UNSPECIFIED CHRONICITY: Primary | ICD-10-CM

## 2024-05-17 PROCEDURE — 99214 OFFICE O/P EST MOD 30 MIN: CPT | Performed by: PHYSICIAN ASSISTANT

## 2024-05-17 ASSESSMENT — PAIN SCALES - GENERAL: PAINLEVEL: NO PAIN (0)

## 2024-05-17 NOTE — PROGRESS NOTES
Assessment & Plan     Left knee pain, unspecified chronicity  Suspect that he is favoring his right side after surgical intervention and placing excessive strain on the left side.  Calf tenderness and muscle spasm noted today on exam.  Overall knee joint appears to be fairly stable.  Would ask physical therapy to see what they can do to help him learn how to stretch and strengthen and follow-up from there.  - Physical Therapy  Referral; Future    HTN, goal below 140/80  We have a prolonged discussion around hypertension and nature thereof.  Advised dietary and exercise changes as well as what ever he can do for weight loss and stress reduction.  Do recommend that he  his own blood pressure cuff and bring it in for paring with our equipment to see how accurate it is and then monitor for 4 to 6 weeks.  If we see an average above 135/85 I would recommend that he begin medication to lower this.    Tremor  Very fine hand tremor noted today.  He states that he notices this more in the morning and may be when he has not had anything to eat for a prolonged time.  Advise adding protein into his diet and careful observation and if this becomes more problematic would have him see neurology for further evaluation and treatment options.    Alea Mckeon is a 58 year old, presenting for the following health issues:  No chief complaint on file.    History of Present Illness       Reason for visit:  Knee pain tight muscles&tendons shaky hands  Symptom onset:  More than a month    He eats 2-3 servings of fruits and vegetables daily.He consumes 0 sweetened beverage(s) daily.He exercises with enough effort to increase his heart rate 10 to 19 minutes per day.  He exercises with enough effort to increase his heart rate 5 days per week. He is missing 1 dose(s) of medications per week.     He would like to have left leg looked at because he seems to be have tightness in the back of the calf. Started about 6 weeks.  Stiffness more than anything than any pain.     Also notices shaky hands with coffee cup. He has a hx of Alzheimer in the family. But other people have brought up his hands are shaking. Its been going on for about 3-4 month.      Has noticed BP has been high recently. About 3 weeks ago he was giving blood and he was running high.       Review of Systems  Constitutional, HEENT, cardiovascular, pulmonary, GI, , musculoskeletal, neuro, skin, endocrine and psych systems are negative, except as otherwise noted.      Objective    BP (!) 140/82   Pulse 62   Temp 97.8  F (36.6  C) (Temporal)   Resp 16   Wt 88.5 kg (195 lb)   SpO2 95%   BMI 28.72 kg/m    Body mass index is 28.72 kg/m .  Physical Exam   GENERAL: alert and no distress  NECK: no adenopathy, no asymmetry, masses, or scars  RESP: lungs clear to auscultation - no rales, rhonchi or wheezes  CV: regular rate and rhythm, normal S1 S2, no S3 or S4, no murmur, click or rub, no peripheral edema  ABDOMEN: soft, nontender, no hepatosplenomegaly, no masses and bowel sounds normal  MS: no gross musculoskeletal defects noted, no edema, very fine tremor to both hands noted today upon physical exam.  Both knees appear to be fairly stable with range of motion  and no concerns related to joint instability.  SKIN: no suspicious lesions or rashes To exposed visible skin.  NEURO: Normal strength and tone, mentation intact and speech normal  PSYCH: mentation appears normal, affect normal/bright    No results found for this or any previous visit (from the past 24 hour(s)).        Signed Electronically by: John Briseno PA-C

## 2024-07-07 DIAGNOSIS — E78.5 HYPERLIPIDEMIA LDL GOAL <130: ICD-10-CM

## 2024-07-08 RX ORDER — ATORVASTATIN CALCIUM 10 MG/1
10 TABLET, FILM COATED ORAL DAILY
Qty: 60 TABLET | Refills: 1 | Status: SHIPPED | OUTPATIENT
Start: 2024-07-08 | End: 2024-09-12

## 2024-09-12 DIAGNOSIS — E78.5 HYPERLIPIDEMIA LDL GOAL <130: ICD-10-CM

## 2024-09-12 RX ORDER — ATORVASTATIN CALCIUM 10 MG/1
10 TABLET, FILM COATED ORAL DAILY
Qty: 60 TABLET | Refills: 2 | Status: SHIPPED | OUTPATIENT
Start: 2024-09-12

## 2025-01-25 ENCOUNTER — HEALTH MAINTENANCE LETTER (OUTPATIENT)
Age: 60
End: 2025-01-25

## 2025-04-29 ENCOUNTER — TELEPHONE (OUTPATIENT)
Dept: FAMILY MEDICINE | Facility: OTHER | Age: 60
End: 2025-04-29
Payer: COMMERCIAL

## 2025-04-29 NOTE — TELEPHONE ENCOUNTER
"[facility] calling for refill of medication: Atorvastatin 10 mg tablet.     RN reviewed chart. Last refill sent 03/04/2025 filled as \"jefe, Overdue for annual physical\"     MyChart was sent to patient, not read.     Team please contact patient to schedule to obtain future refills.     Deven Kingston RN on 4/29/2025 at 10:34 AM    "

## 2025-05-02 DIAGNOSIS — E78.5 HYPERLIPIDEMIA LDL GOAL <130: ICD-10-CM

## 2025-05-02 RX ORDER — ATORVASTATIN CALCIUM 10 MG/1
10 TABLET, FILM COATED ORAL DAILY
Qty: 30 TABLET | Refills: 0 | Status: SHIPPED | OUTPATIENT
Start: 2025-05-02

## 2025-05-02 NOTE — TELEPHONE ENCOUNTER
Patient informed via mychart of note below to schedule. 3 day reminder if not read to call or send letter.     Marixa Wright MA

## 2025-07-02 DIAGNOSIS — E78.5 HYPERLIPIDEMIA LDL GOAL <130: ICD-10-CM

## 2025-07-02 RX ORDER — ATORVASTATIN CALCIUM 10 MG/1
10 TABLET, FILM COATED ORAL DAILY
Qty: 30 TABLET | Refills: 0 | OUTPATIENT
Start: 2025-07-02